# Patient Record
Sex: MALE | Race: ASIAN
[De-identification: names, ages, dates, MRNs, and addresses within clinical notes are randomized per-mention and may not be internally consistent; named-entity substitution may affect disease eponyms.]

---

## 2018-10-29 ENCOUNTER — HOSPITAL ENCOUNTER (OUTPATIENT)
Age: 58
Discharge: HOME | End: 2018-10-29

## 2018-10-29 ENCOUNTER — HOSPITAL ENCOUNTER (OUTPATIENT)
Dept: HOSPITAL 91 - GIL | Age: 58
Discharge: HOME | End: 2018-10-29
Payer: COMMERCIAL

## 2018-10-29 DIAGNOSIS — Z12.11: Primary | ICD-10-CM

## 2018-10-29 DIAGNOSIS — E11.9: ICD-10-CM

## 2018-10-29 DIAGNOSIS — D12.5: ICD-10-CM

## 2018-10-29 DIAGNOSIS — K64.8: ICD-10-CM

## 2018-10-29 DIAGNOSIS — K57.90: ICD-10-CM

## 2018-10-29 PROCEDURE — 45380 COLONOSCOPY AND BIOPSY: CPT

## 2018-10-29 PROCEDURE — 82962 GLUCOSE BLOOD TEST: CPT

## 2018-10-29 PROCEDURE — 88305 TISSUE EXAM BY PATHOLOGIST: CPT

## 2024-01-31 ENCOUNTER — OFFICE (OUTPATIENT)
Dept: URBAN - METROPOLITAN AREA CLINIC 33 | Facility: CLINIC | Age: 64
End: 2024-01-31

## 2024-01-31 VITALS
DIASTOLIC BLOOD PRESSURE: 95 MMHG | HEIGHT: 65 IN | TEMPERATURE: 98.1 F | WEIGHT: 149.6 LBS | SYSTOLIC BLOOD PRESSURE: 147 MMHG | HEART RATE: 88 BPM

## 2024-01-31 DIAGNOSIS — K59.00 CONSTIPATION: ICD-10-CM

## 2024-01-31 DIAGNOSIS — K62.5 RECTAL BLEEDING: ICD-10-CM

## 2024-01-31 DIAGNOSIS — Z86.010 PERSONAL HISTORY COLON POLYPS: ICD-10-CM

## 2024-01-31 PROCEDURE — 99204 OFFICE O/P NEW MOD 45 MIN: CPT

## 2024-01-31 NOTE — SERVICEHPINOTES
This patient is seen for evaluation of blood per rectum.   Symptoms began   2     weeks   ago.   Bleeding has occurred at a frequency of   1   times per   day  .    The patient estimates seeing   a teaspoon   of   bright red   blood in the stool.    Stools have been   sausage shaped, but lumpy ([place] Stool Type 2)   in consistency.    Has noted   straining with defecation   in association with the bleeding.   Previous pertinent conditions that have been diagnosed in relation to this problem include   .    The patient has tried    for empiric treatment of suspected hemorrhoids.    Prior notable interventions/surgeries include   .    A colonoscopy was performed   5  years   ago.  Findings from that exam included   colon polyps and internal hemorrhoids  .    
br
brPatient presents for a colonoscopy. The patient had a colonoscopy 5 years ago. The patient has no family history of colon cancer or other significant GI diseases. Patient denies fever, nausea, vomiting, dysphagia, reflux, abdominal pain, change in bowel habits, constipation, diarrhea, melena, and significant change in weight. Denies shortness of breath and chest pain. Patient endorses constipation and rectal bleeding. Patient denies weight loss. Appetite is good. Patient denies hx of sleep apnea. Patient is diabetic and takes Farxiga and metformin and will take half the dose the day before. span id="{[id number]}" class="narrative freetextNormal" type="freetext" canedit="true" suppressed="false" nid="[id number]" gid="{[id number]}" bound="false"/span

## 2024-01-31 NOTE — SERVICENOTES
Richard to do colonoscopy. Diabetic prep. Extra prep. Requesting MAC due to Mall Class 3 and multiple comorbidities.

## 2024-05-01 VITALS
TEMPERATURE: 98.1 F | SYSTOLIC BLOOD PRESSURE: 156 MMHG | WEIGHT: 143 LBS | DIASTOLIC BLOOD PRESSURE: 94 MMHG | OXYGEN SATURATION: 98 % | HEIGHT: 65 IN | RESPIRATION RATE: 15 BRPM | HEART RATE: 98 BPM

## 2024-05-02 ENCOUNTER — AMBULATORY SURGICAL CENTER (OUTPATIENT)
Dept: URBAN - METROPOLITAN AREA SURGERY 17 | Facility: SURGERY | Age: 64
End: 2024-05-02

## 2024-05-02 DIAGNOSIS — K57.30 DIVERTICULOSIS OF LARGE INTESTINE WITHOUT PERFORATION OR ABS: ICD-10-CM

## 2024-05-02 DIAGNOSIS — K62.5 HEMORRHAGE OF ANUS AND RECTUM: ICD-10-CM

## 2024-05-02 DIAGNOSIS — Z86.010 PERSONAL HISTORY OF COLONIC POLYPS: ICD-10-CM

## 2024-05-02 DIAGNOSIS — K64.0 FIRST DEGREE HEMORRHOIDS: ICD-10-CM

## 2024-05-02 PROCEDURE — 45378 DIAGNOSTIC COLONOSCOPY: CPT | Performed by: INTERNAL MEDICINE

## 2024-05-02 NOTE — SERVICEHPINOTES
This patient is seen for evaluation of blood per rectum. Symptoms began 2 weeks ago.  Bleeding has occurred at a frequency of 1 times per day.  The patient estimates seeing  a teaspoon of  bright red blood in the stool.  Stools have been sausage shaped, but lumpy ([place] Stool Type 2) in consistency.  Has noted straining with defecation in association with the bleeding.  A colonoscopy was performed 5 years ago. Findings from that exam included colon polyps and internal hemorrhoids.Patient presents for a colonoscopy. The patient had a colonoscopy 5 years ago. The patient has no family history of colon cancer or other significant GI diseases. Patient denies fever, nausea, vomiting, dysphagia, reflux, abdominal pain, change in bowel habits, constipation, diarrhea, melena, and significant change in weight. Denies shortness of breath and chest pain. Patient endorses constipation and rectal bleeding. Patient denies weight loss. Appetite is good. Patient denies hx of sleep apnea. Patient is diabetic and takes Farxiga and metformin and will take half the dose the day before.